# Patient Record
Sex: MALE | URBAN - METROPOLITAN AREA
[De-identification: names, ages, dates, MRNs, and addresses within clinical notes are randomized per-mention and may not be internally consistent; named-entity substitution may affect disease eponyms.]

---

## 2023-02-01 ENCOUNTER — APPOINTMENT (RX ONLY)
Dept: URBAN - METROPOLITAN AREA CLINIC 203 | Facility: CLINIC | Age: 73
Setting detail: DERMATOLOGY
End: 2023-02-01

## 2023-02-01 PROBLEM — C44.619 BASAL CELL CARCINOMA OF SKIN OF LEFT UPPER LIMB, INCLUDING SHOULDER: Status: ACTIVE | Noted: 2023-02-01

## 2023-02-01 PROCEDURE — 13121 CMPLX RPR S/A/L 2.6-7.5 CM: CPT

## 2023-02-01 PROCEDURE — ? MOHS SURGERY

## 2023-02-01 PROCEDURE — 17313 MOHS 1 STAGE T/A/L: CPT

## 2023-02-01 PROCEDURE — ? PRESCRIPTION

## 2023-02-01 RX ORDER — CEPHALEXIN 500 MG/1
CAPSULE ORAL
Qty: 12 | Refills: 0 | Status: ERX | COMMUNITY
Start: 2023-02-01

## 2023-02-01 RX ORDER — TRAMADOL HYDROCHLORIDE 50 MG/1
TABLET, FILM COATED ORAL
Qty: 8 | Refills: 0 | COMMUNITY
Start: 2023-02-01

## 2023-02-01 RX ADMIN — TRAMADOL HYDROCHLORIDE: 50 TABLET, FILM COATED ORAL at 00:00

## 2023-02-01 RX ADMIN — CEPHALEXIN: 500 CAPSULE ORAL at 00:00

## 2023-02-01 NOTE — PROCEDURE: MOHS SURGERY
Previous Accession (Optional): [id number] [facility] Previous Accession (Optional): [id number] [facility]

## 2023-02-01 NOTE — HPI: SKIN LESION (BASAL CELL CARCINOMA)
How Severe Is Your Skin Cancer?: moderate
Is This A New Presentation, Or A Follow-Up?: Basal Cell Carcinoma
When Was Basal Cell Biopsied? (Optional): 11/29/2022
Accession # (Optional): [id number]

## 2023-02-13 ENCOUNTER — APPOINTMENT (RX ONLY)
Dept: URBAN - METROPOLITAN AREA CLINIC 203 | Facility: CLINIC | Age: 73
Setting detail: DERMATOLOGY
End: 2023-02-13

## 2023-02-13 PROBLEM — C44.619 BASAL CELL CARCINOMA OF SKIN OF LEFT UPPER LIMB, INCLUDING SHOULDER: Status: ACTIVE | Noted: 2023-02-13

## 2023-02-13 PROCEDURE — ? SUTURE REMOVAL

## 2024-05-09 NOTE — PROCEDURE: MOHS SURGERY
Your opinion matters! Thank you for choosing Dr. Nataly Reeves, ENT, at [facility]. In the next few weeks, you may receive a patient satisfaction survey about your most recent clinic visit with us. Please take some time to evaluate your visit. We strive to provide you with the best medical care and hope you were happy with our services. Your input will help us better meet your health care needs in the future. Again, thank you for your feedback and we look forward to your next visit.     Medication prescribed at today’s visit:  Will be sent to your pharmacy electronically. Please allow 1-2 hours for your pharmacy to get the medication ready for you.    Medication Requests:  If you need a refill on your prescription please call your pharmacy and let them know. Please be proactive and call before your medication runs out. The pharmacy will then contact us for the refill. Please allow 24-48 hours for the refill to be processed.     Imaging or Referral orders:   You should be contacted by [facility] within 5-7 business days to schedule these appointments/tests. If you have not been contacted by that time please call the [facility] Sched[facility] department at [phone] or my office if you have further questions.    Test results:  If your physician has ordered additional laboratory or radiology testing as part of your ongoing plan of care, please allow 5-7 business days from the day of your lab draw or test completion for the results to be sent and reviewed by your provider. If your results are critical and require more immediate intervention, you will be contacted sooner. Your results will be conveyed to you via a phone call or letter. If you have not received your results within 2 weeks of your test, please contact our office.     [facility] Registration:  If you are not registered for a [facility] account, please ask your  to send you the link via e-mail.    Clinic hours for   [place]:    Thursday 8:00 am - 12:00 pm         [facility]  ENT/Otolaryngology  [address] [place] [address] [phone]     Nostril Rim Text: The closure involved the nostril rim.